# Patient Record
Sex: FEMALE
[De-identification: names, ages, dates, MRNs, and addresses within clinical notes are randomized per-mention and may not be internally consistent; named-entity substitution may affect disease eponyms.]

---

## 2020-07-10 PROBLEM — Z00.00 ENCOUNTER FOR PREVENTIVE HEALTH EXAMINATION: Status: ACTIVE | Noted: 2020-07-10

## 2020-07-13 ENCOUNTER — APPOINTMENT (OUTPATIENT)
Dept: NEUROSURGERY | Facility: CLINIC | Age: 40
End: 2020-07-13
Payer: COMMERCIAL

## 2020-07-13 VITALS — BODY MASS INDEX: 26.52 KG/M2 | WEIGHT: 175 LBS | HEIGHT: 68 IN

## 2020-07-13 DIAGNOSIS — Z82.49 FAMILY HISTORY OF ISCHEMIC HEART DISEASE AND OTHER DISEASES OF THE CIRCULATORY SYSTEM: ICD-10-CM

## 2020-07-13 DIAGNOSIS — Z87.09 PERSONAL HISTORY OF OTHER DISEASES OF THE RESPIRATORY SYSTEM: ICD-10-CM

## 2020-07-13 DIAGNOSIS — Z78.9 OTHER SPECIFIED HEALTH STATUS: ICD-10-CM

## 2020-07-13 DIAGNOSIS — Z83.3 FAMILY HISTORY OF DIABETES MELLITUS: ICD-10-CM

## 2020-07-13 DIAGNOSIS — Z87.19 PERSONAL HISTORY OF OTHER DISEASES OF THE DIGESTIVE SYSTEM: ICD-10-CM

## 2020-07-13 PROCEDURE — 99204 OFFICE O/P NEW MOD 45 MIN: CPT

## 2020-07-13 RX ORDER — B-COMPLEX WITH VITAMIN C
TABLET ORAL
Refills: 0 | Status: ACTIVE | COMMUNITY

## 2020-07-13 RX ORDER — PREDNISONE 20 MG/1
20 TABLET ORAL
Qty: 10 | Refills: 0 | Status: ACTIVE | COMMUNITY
Start: 2020-06-07

## 2020-07-13 RX ORDER — ERGOCALCIFEROL 1.25 MG/1
1.25 MG CAPSULE, LIQUID FILLED ORAL
Qty: 13 | Refills: 0 | Status: ACTIVE | COMMUNITY
Start: 2020-05-04

## 2020-07-13 RX ORDER — ASCORBIC ACID 500 MG
TABLET ORAL
Refills: 0 | Status: ACTIVE | COMMUNITY

## 2020-07-13 RX ORDER — MELOXICAM 15 MG/1
15 TABLET ORAL
Qty: 30 | Refills: 0 | Status: ACTIVE | COMMUNITY
Start: 2020-06-02

## 2020-07-13 RX ORDER — FAMOTIDINE 20 MG/1
20 TABLET, FILM COATED ORAL
Qty: 10 | Refills: 0 | Status: ACTIVE | COMMUNITY
Start: 2020-06-07

## 2020-07-13 RX ORDER — CHLORHEXIDINE GLUCONATE, 0.12% ORAL RINSE 1.2 MG/ML
0.12 SOLUTION DENTAL
Qty: 473 | Refills: 0 | Status: ACTIVE | COMMUNITY
Start: 2020-02-25

## 2020-07-13 RX ORDER — TRAMADOL HYDROCHLORIDE 50 MG/1
50 TABLET, COATED ORAL
Qty: 10 | Refills: 0 | Status: ACTIVE | COMMUNITY
Start: 2020-06-02

## 2020-07-13 RX ORDER — DESOGESTREL AND ETHINYL ESTRADIOL 0.15-0.03
0.15-3 KIT ORAL
Qty: 84 | Refills: 0 | Status: ACTIVE | COMMUNITY
Start: 2019-12-18

## 2020-07-13 RX ORDER — GABAPENTIN 300 MG/1
300 CAPSULE ORAL
Qty: 30 | Refills: 5 | Status: ACTIVE | COMMUNITY
Start: 2020-07-13 | End: 1900-01-01

## 2020-07-13 RX ORDER — METHYLPREDNISOLONE 4 MG/1
4 TABLET ORAL
Qty: 21 | Refills: 0 | Status: ACTIVE | COMMUNITY
Start: 2020-06-29

## 2020-07-13 RX ORDER — CYCLOBENZAPRINE HYDROCHLORIDE 10 MG/1
10 TABLET, FILM COATED ORAL
Qty: 30 | Refills: 0 | Status: ACTIVE | COMMUNITY
Start: 2020-06-02

## 2020-07-13 NOTE — REASON FOR VISIT
[New Patient Visit] : a new patient visit [Referred By: _________] : Patient was referred by GAVIN [Friend] : friend

## 2020-07-14 NOTE — HISTORY OF PRESENT ILLNESS
[< 3 months] : less than 3 months [FreeTextEntry1] : left arm pain  [de-identified] : This is a 40 yr old right-handed female who presents today for a consultation of left shoulder pain radiating to the left shoulder blade down the left arm since the end of May 2020. It is associated with paresthesia, especially on the 1st and 2nd digits, and weakness. She reports of dropping item with her left hand. Ms. Jamil did participate in physical therapy for seven weeks, and her symptoms did not improve. She received an CANDACE and lidocaine injection which did not alleviate her symptoms. Symptoms are aggravated when sleeping and lifting an object, and it is alleviated with ice and wearing a cervical brace. \par \par MRI of the cervical spine without contrast done on 6/2/2020 showed congenital cervical spinal canal stenosis with moderate to severe narrowing with cord impingement at C4-5 and C5-6.

## 2020-07-14 NOTE — PLAN
[FreeTextEntry1] : Discuss MRI findings with the patient. I believe her symptoms correlate with MRI findings. She has tried conservative management, but failed. She would be a great candidate for a disc arthroplasty at both levels. Discuss risks and benefits, including but not limited to bleeding, infection, CSF leak, spinal cord injury, hardware failure,need to convert to fusion, lack of pain relief, need for other procedures. At this time given that she is having some improvement with conservative measures she will continue those for now. Gabapentin has been prescribed for the parasthesias. She will f/u with MONICA Corona next week to assess medication efficacy and need for titration. I will see her back in 4-6 weeks to further discuss her progress.

## 2020-07-24 ENCOUNTER — APPOINTMENT (OUTPATIENT)
Dept: NEUROSURGERY | Facility: CLINIC | Age: 40
End: 2020-07-24
Payer: COMMERCIAL

## 2020-07-24 PROCEDURE — 99443: CPT

## 2020-07-24 NOTE — HISTORY OF PRESENT ILLNESS
[FreeTextEntry1] : Ms. Jamil initially presented last week of left arm pain, associated with paresthesia and weakness. She was prescribed gabapentin last week, and she reports she never took the medication. Currently the pain is tolerable, and she continues to have intermittent paresthesia. Ms. Jamil continues to go to physical therapy but feels it is not beneficial. 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 She reports since she has not been working her symptoms have been stable, but feels it may get worse if she returns to work. She is considering surgery at this time. Recent cervical MRI showed C4-5, C5-6 moderate to severe stenosis with cord impingement.\par

## 2020-08-04 ENCOUNTER — APPOINTMENT (OUTPATIENT)
Dept: NEUROSURGERY | Facility: CLINIC | Age: 40
End: 2020-08-04
Payer: COMMERCIAL

## 2020-08-04 PROCEDURE — 99213 OFFICE O/P EST LOW 20 MIN: CPT

## 2020-08-04 PROCEDURE — 99442: CPT

## 2020-08-07 NOTE — HISTORY OF PRESENT ILLNESS
[Home] : at home, [unfilled] , at the time of the visit. [Verbal consent obtained from patient] : the patient, [unfilled] [FreeTextEntry1] : I am following up with Ms. Jamil. She  has sig less radicular pain and improved strength. She is going to discuss with her work if there is the possibility of her returning modified duty. If her pain is exacerbated by light duty work she will consider surgical intervention which would be appropriate.

## 2020-08-07 NOTE — ASSESSMENT
[FreeTextEntry1] : We discussed TDR again. She would like tosee if she cont to improve when she goes back to work light duty if this is an option. If not we will proceed to surgery.

## 2020-10-05 ENCOUNTER — APPOINTMENT (OUTPATIENT)
Dept: NEUROSURGERY | Facility: CLINIC | Age: 40
End: 2020-10-05
Payer: COMMERCIAL

## 2020-10-05 VITALS — WEIGHT: 160 LBS | HEIGHT: 68 IN | BODY MASS INDEX: 24.25 KG/M2

## 2020-10-05 PROCEDURE — 99214 OFFICE O/P EST MOD 30 MIN: CPT

## 2020-10-07 NOTE — HISTORY OF PRESENT ILLNESS
[FreeTextEntry1] : Ms. Jamil presents today in follow up reporting neck pain radiating to the left arm has improve since the pain began back in June. Reports of intermittent paresthesia and weakness, especially on the left 4 and 5th digits. Cervical MRI showed C4-5, C5-6 moderate to severe stenosis with cord impingement. Going to physical therapy which is helping with her symptoms. She is not working as of yet. EMG showed Left C6-7 radiculopathy.

## 2020-10-07 NOTE — PHYSICAL EXAM
[FreeTextEntry1] : Constitutional: Well appearing, no distress\par HEENT: Normocephalic Atraumatic\par Psychiatric: Alert and oriented to all spheres, normal mood\par Pulmonary: no respiratory distress\par Abdomen: non-distended\par Vascular/Extremities: no edema, no cyanosis, no clubbing\par \par \par Neurologic: \par CN II-XII grossly intact\par ROM: Full in cervical and lumbar spine\par Palpation: no pain to palpation in cervical spine, no pain to palpation in lumbar spine\par Strength: Full strength in all major muscle groups, no atrophy, minimal weakness of left interossei\par Sensation: Full sensation to light touch in all extremities\par Reflexes: \par               2+ patellar\par               2+ biceps\par               2+ ankle jerk\par              No Katz's\par              No clonus\par              No babinski\par \par Signs:\par SLR negative\par L'hermitte's negative\par \par Gait: toe, heel, tandem fluid\par \par \par \par \par

## 2020-10-21 ENCOUNTER — APPOINTMENT (OUTPATIENT)
Dept: NEUROSURGERY | Facility: CLINIC | Age: 40
End: 2020-10-21
Payer: COMMERCIAL

## 2020-10-21 PROCEDURE — 99441: CPT

## 2020-10-21 NOTE — HISTORY OF PRESENT ILLNESS
[Home] : at home, [unfilled] , at the time of the visit. [Medical Office: ([facility])___] : at the medical office located in  [Verbal consent obtained from patient] : the patient, [unfilled] [FreeTextEntry1] : Ms. Jamil reports today that she continues to have neck pain radiating to the left arm with paresthesia which is intermittent. 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 She continues to go to physical therapy which helps to a degree. Ms. Jamil has C4-5, C5-6 mod/severe stenois.

## 2021-06-15 ENCOUNTER — APPOINTMENT (OUTPATIENT)
Dept: NEUROSURGERY | Facility: CLINIC | Age: 41
End: 2021-06-15
Payer: COMMERCIAL

## 2021-06-15 DIAGNOSIS — M47.812 SPONDYLOSIS W/OUT MYELOPATHY OR RADICULOPATHY, CERVICAL REGION: ICD-10-CM

## 2021-06-15 DIAGNOSIS — M54.12 RADICULOPATHY, CERVICAL REGION: ICD-10-CM

## 2021-06-15 PROCEDURE — 99442: CPT

## 2021-06-15 RX ORDER — METHOCARBAMOL 750 MG/1
750 TABLET, FILM COATED ORAL EVERY 6 HOURS
Qty: 28 | Refills: 2 | Status: ACTIVE | COMMUNITY
Start: 2021-06-15 | End: 1900-01-01

## 2021-06-15 NOTE — HISTORY OF PRESENT ILLNESS
[de-identified] : Ms. Jamil developed an exacerbation of cervical spasms and radicular pain into the left arm a week ago. No recent trauma. No weakness. She states she has been doing relatively well up until now. She was last seen in October 2021 and states she did PT which did provide relief at that time. She has trialed CESIs in the past. \par \par MRI cervical spine from 6/2020 showed C4/5 C5/6 spondylosis with moderate to severe stenosis EMG 9/2020 showed Left C6-7 radiculopathy. \par  \par \par

## 2021-06-15 NOTE — REASON FOR VISIT
[Home] : at home, [unfilled] , at the time of the visit. [Medical Office: ([facility])___] : at the medical office located in  [Verbal consent obtained from patient] : the patient, [unfilled]

## 2021-06-15 NOTE — ASSESSMENT
[FreeTextEntry1] : We had a thorough discussion regarding her condition. She will restart PT to see if this will help. I have emailed her the PT referral. I have also prescribed a muscle relaxant to take as needed. If her symptoms do not improve she can consider another CHETAN as well. If no improvement with conservative treatment or if she develops any acute neuro deficits she will notify me and an updated MRI will be ordered.